# Patient Record
Sex: MALE | Race: WHITE | Employment: FULL TIME
[De-identification: names, ages, dates, MRNs, and addresses within clinical notes are randomized per-mention and may not be internally consistent; named-entity substitution may affect disease eponyms.]

---

## 2017-07-22 ENCOUNTER — HEALTH MAINTENANCE LETTER (OUTPATIENT)
Age: 52
End: 2017-07-22

## 2018-11-11 ENCOUNTER — HOSPITAL ENCOUNTER (EMERGENCY)
Facility: CLINIC | Age: 53
Discharge: HOME | End: 2018-11-11
Attending: INTERNAL MEDICINE | Admitting: INTERNAL MEDICINE
Payer: OTHER MISCELLANEOUS

## 2018-11-11 ENCOUNTER — APPOINTMENT (OUTPATIENT)
Dept: ULTRASOUND IMAGING | Facility: CLINIC | Age: 53
End: 2018-11-11
Attending: INTERNAL MEDICINE
Payer: OTHER MISCELLANEOUS

## 2018-11-11 ENCOUNTER — APPOINTMENT (OUTPATIENT)
Dept: GENERAL RADIOLOGY | Facility: CLINIC | Age: 53
End: 2018-11-11
Attending: INTERNAL MEDICINE
Payer: OTHER MISCELLANEOUS

## 2018-11-11 VITALS
TEMPERATURE: 98.2 F | OXYGEN SATURATION: 100 % | SYSTOLIC BLOOD PRESSURE: 139 MMHG | DIASTOLIC BLOOD PRESSURE: 84 MMHG | RESPIRATION RATE: 16 BRPM

## 2018-11-11 DIAGNOSIS — S93.402A SPRAIN OF LEFT ANKLE, UNSPECIFIED LIGAMENT, INITIAL ENCOUNTER: ICD-10-CM

## 2018-11-11 PROCEDURE — 73610 X-RAY EXAM OF ANKLE: CPT | Mod: LT

## 2018-11-11 PROCEDURE — 99284 EMERGENCY DEPT VISIT MOD MDM: CPT | Mod: 25

## 2018-11-11 PROCEDURE — 93971 EXTREMITY STUDY: CPT | Mod: LT

## 2018-11-11 RX ORDER — HYDROCODONE BITARTRATE AND ACETAMINOPHEN 5; 325 MG/1; MG/1
1-2 TABLET ORAL EVERY 4 HOURS PRN
Qty: 10 TABLET | Refills: 0 | Status: SHIPPED | OUTPATIENT
Start: 2018-11-11

## 2018-11-11 ASSESSMENT — ENCOUNTER SYMPTOMS
SHORTNESS OF BREATH: 0
NUMBNESS: 0
ARTHRALGIAS: 1

## 2018-11-11 NOTE — ED PROVIDER NOTES
History     Chief Complaint:  Ankle Pain    HPI   Nando Hair is a 53 year old male who presents to the emergency department today with ankle pain. Patient was in a restaurant in [place] 2 days ago and fell after missing a step. He fell down and twisted his left ankle, hit his elbow and knee as well. Patient has not been able to ambulate on the ankle. He then took a long flight. The ankle appears swollen and he rates his pain as 4/10 currently. He denies numbness in the toes. He denies chest pain, shortness of breath. He took 600 mg of Tylenol last night.     Cardiac/PE/DVT Risk Factors:  History of hypertension - negative   History of hyperlipidemia - positive   History of diabetes - Prediabetes  History of smoking - negative   Personal history of PE/DVT - negative   Family history of PE/DVT - negative   Family history of heart complications - negative   Recent travel - positive   Recent surgery - negative  Other immobilizations - negative   Cancer - negative      Allergies:  No Known Drug Allergies     Medications:    Aspirin  Proctofoam  Lidocaine   Metformin  Zocor   Aquaphor   Desyrel  Kenalog     Past Medical History:    Hyperlipidemia  Prediabetes  Elevated blood pressure without diagnosis of hypertension     Past Surgical History:    Tonsillectomy     Family History:    Diabetes    Social History:  The patient was accompanied to the ED by .  Smoking Status: Never  Smokeless Tobacco: Never  Alcohol Use: 1.0 oz/ week    Marital Status:      Review of Systems   Respiratory: Negative for shortness of breath.    Cardiovascular: Negative for chest pain.   Musculoskeletal: Positive for arthralgias (left ankle).   Neurological: Negative for numbness.   All other systems reviewed and are negative.    Physical Exam     Patient Vitals for the past 24 hrs:   BP Temp Temp src Heart Rate Resp SpO2   11/11/18 1126 - 98.2  F (36.8  C) Temporal - - -   11/11/18 1124 143/87 - - 83 16 100 %      Physical Exam    Constitutional: He is cooperative.   Cardiovascular: Normal pulses.    Musculoskeletal:        Left ankle: He exhibits swelling. Tenderness.        Left lower leg: He exhibits tenderness and swelling.   Neurological: He is alert. No sensory deficit.   Skin: No abrasion, no ecchymosis and no laceration noted.       Emergency Department Course   Imaging:  Radiology findings were communicated with the patient and family who voiced understanding of the findings.  US Lower Extremity Venous duplex  IMPRESSION: There is no evidence of deep venous thrombosis in the left lower extremity.  Report per radiology     XR ankle left   IMPRESSION: Mild irregularity and ossific densities along the tips of the medial and lateral malleoli. These may be related to old injury, however acute injury cannot be completely excluded. No evidence of talar subluxation or syndesmotic space widening. Soft tissue swelling is present most conspicuous along the lateral malleolus.  Report per radiology      Emergency Department Course:  Nursing notes and vitals reviewed.  1136: I performed an exam of the patient as documented above.   The patient was sent for a US Lower Extremity Venous duplex and XR ankle left while in the emergency department, results above.   1250: Findings and plan explained to the Patient and spouse. Patient discharged home with instructions regarding supportive care, medications, and reasons to return. The importance of close follow-up was reviewed. The patient was prescribed Norco and Free Spirit Knee/Leg Walker.   I personally reviewed the imaging results with the Patient and spouse and answered all related questions prior to discharge.   Impression & Plan    Medical Decision Making:    Nando Hair is a 53 year old male who presents to the emergency department complaining of left ankle and lower leg pain.  Given his recent prolonged travel and swelling of the entire lower leg I considered DVT.  Ultrasound is negative for  this.  I think the swelling is related to his ankle injury only and lack of mobility.  X-ray does demonstrate some findings suggestive of a evulsion of near the malleoli.  He has been unable to bear weight and does not do well on crutches.  We have placed him in a cam walker boot to aid mobility.  He suggested a knee scooter to improve his ability [place] get [place] which I think is appropriate.  I will have him ice and elevate, weightbearing as tolerated, close follow-up with orthopedics, return if problems.      Diagnosis:    ICD-10-CM    1. Sprain of left ankle, unspecified ligament, initial encounter S93.402A        Disposition:  discharged to home    Discharge Medications:  New Prescriptions    HYDROCODONE-ACETAMINOPHEN (NORCO) 5-325 MG PER TABLET    Take 1-2 tablets by mouth every 4 hours as needed for severe pain    MISC. DEVICES (FREE SPIRIT KNEE/LEG WALKER) MISC    1 each daily       Scribe Disclosure:  I, Azalia Gamboa, am serving as a scribe at 11:36 AM on 11/11/2018 to document services personally performed by Treasure Concepcion MD based on my observations and the provider's statements to me.    11/11/2018   [facility] EMERGENCY DEPARTMENT       Treasure oCncepcion MD  11/11/18 9934

## 2018-11-11 NOTE — ED AVS SNAPSHOT
[facility] Emergency Department    [address]    [facility] [address]    Phone:  [phone]    Fax:  [phone]                                       Nando Hair   MRN: [id number]    Department:  [facility] Emergency Department   Date of Visit:  11/11/2018           After Visit Summary Signature Page     I have received my discharge instructions, and my questions have been answered. I have discussed any challenges I see with this plan with the nurse or doctor.    ..........................................................................................................................................  Patient/Patient Representative Signature      ..........................................................................................................................................  Patient Representative Print Name and Relationship to Patient    ..................................................               ................................................  Date                                   Time    ..........................................................................................................................................  Reviewed by Signature/Title    ...................................................              ..............................................  Date                                               Time          22EPIC Rev 08/18

## 2018-11-11 NOTE — DISCHARGE INSTRUCTIONS
Discharge Instructions  Ankle Sprain    An ankle sprain is a stretching or tearing of a ligament around your ankle joint. In most cases, we recommend resting the ankle for about 3 days, followed by return to activity. Some severe sprains need longer periods of rest, or can require a cast or boot to immobilize them.    Return to the Emergency Department if:    Your pain is much worse, or if there is pain in a new area.    Your foot or leg becomes pale, cool, blue, or numb or tingling.    There is anything concerning to you about how your ankle looks.    Any splint or device is feeling too tight, causing pain, or rubbing into your skin.    Follow-up with your doctor:    As recommended by your emergency physician.    If your ankle is not back to normal within about 1 week.    If you are involved in significant athletic activities.        Treatment:    Apply ice your injured area for 15 minutes at a time, at least 3 times a day for the first 1-2 days. Use a cloth between the ice bag and your skin to prevent frostbite.     Do not sleep with an ice pack or heating pad on, since this can cause burns or skin injury.    Raise the injured area above the level of your heart as much as possible in the first 1-2 days.    Pain medications -- You may take a pain medication such as Tylenol  (acetaminophen), Advil , Nuprin  (ibuprofen) or Aleve  (naproxen).  If you have been given a narcotic such as Vicodin  (hydrocodone with acetaminophen), Percocet  (oxycodone with acetaminophen), or codeine, do not drive for four hours after you have taken it. If the narcotic contains Tylenol  (acetaminophen), do not take Tylenol  with it. All narcotics will cause constipation, so eat a high fiber diet.      Splint. We often give a stirrup-shaped ankle splint to support your ankle and prevent it from turning again. Wear this all the time for the first 3-5 days, and then as directed by your doctor.    Crutches. If you can t put wait on the ankle  without a lot of pain, we recommend crutches. You can put as much weight on the ankle as possible without severe pain.     Compression. An elastic bandage (Ace  wrap) can help with pain and swelling. Remove this at least twice a day, and leave it off for several hours if you develop swelling of the foot.   If you were given a prescription for medicine here today, be sure to read all of the information (including the package insert) that comes with your prescription.  This will include important information about the medicine, its side effects, and any warnings that you need to know about.  The pharmacist who fills the prescription can provide more information and answer questions you may have about the medicine.  If you have questions or concerns that the pharmacist cannot address, please call or return to the Emergency Department.    Remember that you can always come back to the Emergency Department if you are not able to see your regular doctor in the amount of time listed above, if you get any new symptoms, or if there is anything that worries you.

## 2018-11-11 NOTE — ED AVS SNAPSHOT
[facility] Emergency Department    [address]    [facility] [address]    Phone:  [phone]    Fax:  [phone]                                       Nando Hair   MRN: [id number]    Department:  [facility] Emergency Department   Date of Visit:  11/11/2018           Patient Information     Date Of Birth          1965        Your diagnoses for this visit were:     Sprain of left ankle, unspecified ligament, initial encounter        You were seen by Treasure Concepcion MD.      Follow-up Information     [facility].    Contact information:    [address]  [address]  [facility] [address] [phone]        Discharge Instructions       Discharge Instructions  Ankle Sprain    An ankle sprain is a stretching or tearing of a ligament around your ankle joint. In most cases, we recommend resting the ankle for about 3 days, followed by return to activity. Some severe sprains need longer periods of rest, or can require a cast or boot to immobilize them.    Return to the Emergency Department if:    Your pain is much worse, or if there is pain in a new area.    Your foot or leg becomes pale, cool, blue, or numb or tingling.    There is anything concerning to you about how your ankle looks.    Any splint or device is feeling too tight, causing pain, or rubbing into your skin.    Follow-up with your doctor:    As recommended by your emergency physician.    If your ankle is not back to normal within about 1 week.    If you are involved in significant athletic activities.        Treatment:    Apply ice your injured area for 15 minutes at a time, at least 3 times a day for the first 1-2 days. Use a cloth between the ice bag and your skin to prevent frostbite.     Do not sleep with an ice pack or heating pad on, since this can cause burns or skin injury.    Raise the injured area above the level of your heart as  much as possible in the first 1-2 days.    Pain medications -- You may take a pain medication such as Tylenol  (acetaminophen), Advil , Nuprin  (ibuprofen) or Aleve  (naproxen).  If you have been given a narcotic such as Vicodin  (hydrocodone with acetaminophen), Percocet  (oxycodone with acetaminophen), or codeine, do not drive for four hours after you have taken it. If the narcotic contains Tylenol  (acetaminophen), do not take Tylenol  with it. All narcotics will cause constipation, so eat a high fiber diet.      Splint. We often give a stirrup-shaped ankle splint to support your ankle and prevent it from turning again. Wear this all the time for the first 3-5 days, and then as directed by your doctor.    Crutches. If you can t put wait on the ankle without a lot of pain, we recommend crutches. You can put as much weight on the ankle as possible without severe pain.     Compression. An elastic bandage (Ace  wrap) can help with pain and swelling. Remove this at least twice a day, and leave it off for several hours if you develop swelling of the foot.   If you were given a prescription for medicine here today, be sure to read all of the information (including the package insert) that comes with your prescription.  This will include important information about the medicine, its side effects, and any warnings that you need to know about.  The pharmacist who fills the prescription can provide more information and answer questions you may have about the medicine.  If you have questions or concerns that the pharmacist cannot address, please call or return to the Emergency Department.    Remember that you can always come back to the Emergency Department if you are not able to see your regular doctor in the amount of time listed above, if you get any new symptoms, or if there is anything that worries you.        24 Hour Appointment Hotline       To make an appointment at any [facility], call [phone]  ([phone]). If you don't have a family doctor or clinic, we will help you find one. [place] clinics are conveniently located to serve the needs of you and your family.             Review of your medicines      START taking        Dose / Directions Last dose taken    FREE SPIRIT KNEE/LEG WALKER Misc   Dose:  1 each   Quantity:  1 each        1 each daily   Refills:  0        HYDROcodone-acetaminophen 5-325 MG per tablet   Commonly known as:  NORCO   Dose:  1-2 tablet   Quantity:  10 tablet        Take 1-2 tablets by mouth every 4 hours as needed for severe pain   Refills:  0          Our records show that you are taking the medicines listed below. If these are incorrect, please call your family doctor or clinic.        Dose / Directions Last dose taken    AQUAPHOR LIP REPAIR Oint   Dose:  1 g   Quantity:  2 Tube        Externally apply 1 g topically daily as needed   Refills:  3        ascorbic acid 500 MG tablet   Commonly known as:  VITAMIN C   Dose:  1 tablet        Take 1 tablet by mouth daily.   Refills:  0        aspirin 81 MG EC tablet   Dose:  81 mg   Quantity:  90 tablet        Take 1 tablet by mouth daily.   Refills:  3        hydrocortisone-pramoxine rectal foam   Commonly known as:  PROCTOFOAM-HC   Dose:  1 applicator   Quantity:  10 g        Place 1 applicator rectally 2 times daily.   Refills:  0        Lidocaine HCl 0.5 % Gel   Dose:  0.5 g   Quantity:  30 g        Externally apply 0.5 g topically 3 times daily as needed   Refills:  1        metFORMIN 500 MG tablet   Commonly known as:  GLUCOPHAGE   Dose:  1000 mg   Quantity:  180 tablet        Take 2 tablets (1,000 mg) by mouth daily (with breakfast)   Refills:  3        simvastatin 40 MG tablet   Commonly known as:  ZOCOR   Dose:  40 mg   Quantity:  90 tablet        Take 1 tablet (40 mg) by mouth At Bedtime   Refills:  3        traZODone 50 MG tablet   Commonly known as:  DESYREL   Dose:  50 mg   Quantity:  30 tablet        Take 1 tablet by  mouth nightly as needed for sleep.   Refills:  3        triamcinolone 0.1 % ointment   Commonly known as:  KENALOG   Quantity:  30 g        Apply sparingly to affected area three times daily for 14 days.   Refills:  0                Information about OPIOIDS     PRESCRIPTION OPIOIDS: WHAT YOU NEED TO KNOW   We gave you an opioid (narcotic) pain medicine. It is important to manage your pain, but opioids are not always the best choice. You should first try all the other options your care team gave you. Take this medicine for as short a time (and as few doses) as possible.    Some activities can increase your pain, such as bandage changes or therapy sessions. It may help to take your pain medicine 30 to 60 minutes before these activities. Reduce your stress by getting enough sleep, working on hobbies you enjoy and practicing relaxation or meditation. Talk to your care team about ways to manage your pain beyond prescription opioids.    These medicines have risks:    DO NOT drive when on new or higher doses of pain medicine. These medicines can affect your alertness and reaction times, and you could be arrested for driving under the influence (DUI). If you need to use opioids long-term, talk to your care team about driving.    DO NOT operate heavy machinery    DO NOT do any other dangerous activities while taking these medicines.    DO NOT drink any alcohol while taking these medicines.     If the opioid prescribed includes acetaminophen, DO NOT take with any other medicines that contain acetaminophen. Read all labels carefully. Look for the word  acetaminophen  or  Tylenol.  Ask your pharmacist if you have questions or are unsure.    You can get addicted to pain medicines, especially if you have a history of addiction (chemical, alcohol or substance dependence). Talk to your care team about ways to reduce this risk.    All opioids tend to cause constipation. Drink plenty of water and eat foods that have a lot of fiber,  such as fruits, vegetables, prune juice, apple juice and high-fiber cereal. Take a laxative (Miralax, milk of magnesia, Colace, Senna) if you don t move your bowels at least every other day. Other side effects include upset stomach, sleepiness, dizziness, throwing up, tolerance (needing more of the medicine to have the same effect), physical dependence and slowed breathing.    Store your pills in a secure place, locked if possible. We will not replace any lost or stolen medicine. If you don t finish your medicine, please throw away (dispose) as directed by your pharmacist. The [place] [employer]ution Control Agency has more information about safe disposal: https://www.pca.[facility].[place].us/living-green/managing-unwanted-medications        Prescriptions were sent or printed at these locations (2 Prescriptions)                   Other Prescriptions                Printed at Department/Unit printer (2 of 2)         HYDROcodone-acetaminophen (NORCO) 5-325 MG per tablet               Misc. Devices (FREE SPIRIT KNEE/LEG WALKER) MISC                Procedures and tests performed during your visit     US Lower Extremity Venous Duplex Left    XR Ankle Left G/E 3 Views      Orders Needing Specimen Collection     None      Pending Results     No orders found from 11/9/2018 to 11/12/2018.            Pending Culture Results     No orders found from 11/9/2018 to 11/12/2018.            Pending Results Instructions     If you had any lab results that were not finalized at the time of your Discharge, you can call the ED Lab Result RN at [phone]. You will be contacted by this team for any positive Lab results or changes in treatment. The nurses are available 7 days a week from 10A to 6:30P.  You can leave a message 24 hours per day and they will return your call.        Test Results From Your Hospital Stay        11/11/2018 12:50 PM      Narrative     LEFT ANKLE THREE OR MORE VIEWS 11/11/2018 12:16 PM     HISTORY: Fall, pain.      COMPARISON: None.        Impression     IMPRESSION: Mild irregularity and ossific densities along the tips of  the medial and lateral malleoli. These may be related to old injury,  however acute injury cannot be completely excluded. No evidence of  talar subluxation or syndesmotic space widening. Soft tissue swelling  is present most conspicuous along the lateral malleolus.    DARRYL CUTLER MD         11/11/2018 12:19 PM      Narrative     LEFT LOWER EXTREMITY VENOUS DOPPLER ULTRASOUND  11/11/2018 12:09 PM    HISTORY: Left lower extremity pain and swelling. The concern is for  deep venous thrombosis.    COMPARISON: None.    FINDINGS: Color flow and Doppler spectral waveform analysis was  performed of the common femoral, femoral, popliteal, posterior tibial,  and greater saphenous veins of the left lower extremity. No thrombus  is seen.        Impression     IMPRESSION: There is no evidence of deep venous thrombosis in the left  lower extremity.    RUPESH LIU MD                Clinical Quality Measure: Blood Pressure Screening     Your blood pressure was checked while you were in the emergency department today. The last reading we obtained was  BP: 143/87 . Please read the guidelines below about what these numbers mean and what you should do about them.  If your systolic blood pressure (the top number) is less than 120 and your diastolic blood pressure (the bottom number) is less than 80, then your blood pressure is normal. There is nothing more that you need to do about it.  If your systolic blood pressure (the top number) is 120-139 or your diastolic blood pressure (the bottom number) is 80-89, your blood pressure may be higher than it should be. You should have your blood pressure rechecked within a year by a primary care provider.  If your systolic blood pressure (the top number) is 140 or greater or your diastolic blood pressure (the bottom number) is 90 or greater, you may have high blood  pressure. High blood pressure is treatable, but if left untreated over time it can put you at risk for heart attack, stroke, or kidney failure. You should have your blood pressure rechecked by a primary care provider within the next 4 weeks.  If your provider in the emergency department today gave you specific instructions to follow-up with your doctor or provider even sooner than that, you should follow that instruction and not wait for up to 4 weeks for your follow-up visit.        Thank you for choosing [facility]       Thank you for choosing [facility] for your care. Our goal is always to provide you with excellent care. Hearing back from our patients is one way we can continue to improve our services. Please take a few minutes to complete the written survey that you may receive in the mail after you visit with us. Thank you!        [email or web address] Information     [employer] gives you secure access to your electronic health record. If you see a primary care provider, you can also send messages to your care team and make appointments. If you have questions, please call your primary care clinic.  If you do not have a primary care provider, please call [phone] and they will assist you.        Care EveryWhere ID     This is your Care EveryWhere ID. This could be used by other organizations to access your [facility] medical records  [id number]        Equal Access to Services     DESEAN CHE : Sarah Beth Blackburn, jan martinez, fermin lucas. [facility] [phone].    ATENCIÓN: Si habla español, tiene a sotomayor disposición servicios gratuitos de asistencia lingüística. Llame al [phone].    We comply with applicable federal civil rights laws and [place] laws. We do not discriminate on the basis of race, color, national origin, age, disability, sex, sexual orientation, or gender identity.            After Visit Summary       This is your record. Keep this with  you and show to your community pharmacist(s) and doctor(s) at your next visit.

## 2018-11-11 NOTE — ED TRIAGE NOTES
Right ankle pain after fall on 'a single stair'. Pain and swelling to the area. Unable to bare weight.

## 2019-12-16 ENCOUNTER — HEALTH MAINTENANCE LETTER (OUTPATIENT)
Age: 54
End: 2019-12-16